# Patient Record
Sex: MALE | Race: WHITE | Employment: FULL TIME | URBAN - METROPOLITAN AREA
[De-identification: names, ages, dates, MRNs, and addresses within clinical notes are randomized per-mention and may not be internally consistent; named-entity substitution may affect disease eponyms.]

---

## 2020-11-25 ENCOUNTER — OFFICE VISIT (OUTPATIENT)
Dept: FAMILY MEDICINE CLINIC | Age: 30
End: 2020-11-25
Payer: COMMERCIAL

## 2020-11-25 VITALS
HEIGHT: 71 IN | WEIGHT: 225 LBS | TEMPERATURE: 97.2 F | BODY MASS INDEX: 31.5 KG/M2 | HEART RATE: 105 BPM | OXYGEN SATURATION: 96 %

## 2020-11-25 PROCEDURE — G8427 DOCREV CUR MEDS BY ELIG CLIN: HCPCS | Performed by: NURSE PRACTITIONER

## 2020-11-25 PROCEDURE — 99213 OFFICE O/P EST LOW 20 MIN: CPT | Performed by: NURSE PRACTITIONER

## 2020-11-25 PROCEDURE — G8417 CALC BMI ABV UP PARAM F/U: HCPCS | Performed by: NURSE PRACTITIONER

## 2020-11-25 PROCEDURE — 4004F PT TOBACCO SCREEN RCVD TLK: CPT | Performed by: NURSE PRACTITIONER

## 2020-11-25 PROCEDURE — G8484 FLU IMMUNIZE NO ADMIN: HCPCS | Performed by: NURSE PRACTITIONER

## 2020-11-25 RX ORDER — PREDNISONE 20 MG/1
40 TABLET ORAL DAILY
Qty: 6 TABLET | Refills: 0 | Status: SHIPPED | OUTPATIENT
Start: 2020-11-25 | End: 2020-11-28

## 2020-11-25 RX ORDER — MECLIZINE HYDROCHLORIDE 25 MG/1
25 TABLET ORAL 3 TIMES DAILY PRN
Qty: 20 TABLET | Refills: 0 | Status: SHIPPED | OUTPATIENT
Start: 2020-11-25 | End: 2020-12-05

## 2020-11-25 RX ORDER — OMEPRAZOLE 20 MG/1
40 CAPSULE, DELAYED RELEASE ORAL DAILY
COMMUNITY

## 2020-11-25 RX ORDER — AMOXICILLIN 875 MG/1
875 TABLET, COATED ORAL 2 TIMES DAILY
Qty: 14 TABLET | Refills: 0 | Status: SHIPPED | OUTPATIENT
Start: 2020-11-25 | End: 2020-12-02

## 2020-11-25 ASSESSMENT — ENCOUNTER SYMPTOMS
CHEST TIGHTNESS: 0
SINUS PRESSURE: 0
WHEEZING: 0
VOMITING: 1
EYE DISCHARGE: 0
COUGH: 0
SHORTNESS OF BREATH: 0
VOICE CHANGE: 0
BACK PAIN: 1
SORE THROAT: 0
NAUSEA: 1
EYE REDNESS: 0

## 2020-11-25 NOTE — PROGRESS NOTES
[address] [facility]  [address] [place] [address]  Dept: [phone]  Dept Fax: [phone]     Vandana Toure is a 35 y.o. male who presents to the urgent care today for his medicalconditions/complaints as noted below. Vandana Toure is c/o of Covid Testing (left ear is ringing, throwing up, dizzy, lower back pain )    HPI:      Otalgia    There is pain in the left ear. This is a new problem. Episode onset: 2 weeks ago. The problem has been waxing and waning. There has been no fever. Associated symptoms include vomiting. Pertinent negatives include no coughing, ear discharge, headaches, rash or sore throat. He has tried nothing for the symptoms. The treatment provided no relief. History reviewed. No pertinent past medical history. Current Outpatient Medications   Medication Sig Dispense Refill    omeprazole (PRILOSEC) 20 MG delayed release capsule Take 40 mg by mouth daily      predniSONE (DELTASONE) 20 MG tablet Take 2 tablets by mouth daily for 3 days 6 tablet 0    amoxicillin (AMOXIL) 875 MG tablet Take 1 tablet by mouth 2 times daily for 7 days 14 tablet 0    meclizine (ANTIVERT) 25 MG tablet Take 1 tablet by mouth 3 times daily as needed for Dizziness or Nausea 20 tablet 0     No current facility-administered medications for this visit. No Known Allergies    Reviewed PMH, SH, and FH with the patient and updated. Subjective:      Review of Systems   Constitutional: Negative for chills, fatigue and fever. HENT: Positive for ear pain (left) and tinnitus (left). Negative for congestion, ear discharge, postnasal drip, sinus pressure, sneezing, sore throat and voice change. Eyes: Negative for discharge and redness. Respiratory: Negative for cough, chest tightness, shortness of breath and wheezing. Cardiovascular: Negative. Negative for chest pain. Gastrointestinal: Positive for nausea and vomiting.    Musculoskeletal: sciatica         Plan:      Left ear pain--  Patient instructed to complete antibiotic prescription fully. Prednisone sent to the pharmacy to help enable symptom relief. Meclizine as needed for the dizziness/nausea. May use Tylenol for fever/pain. Warm compresses as desired for ear pain. Low back pain--  Motrin 800 mg TID for the discomfort/inflammation. Declined flexeril at this time. Heat therapy if desired. Home stretches provided on AVS.  Patient agreeable to treatment plan. Follow up if symptoms do not improve/worsen. Orders Placed This Encounter   Medications    predniSONE (DELTASONE) 20 MG tablet     Sig: Take 2 tablets by mouth daily for 3 days     Dispense:  6 tablet     Refill:  0    amoxicillin (AMOXIL) 875 MG tablet     Sig: Take 1 tablet by mouth 2 times daily for 7 days     Dispense:  14 tablet     Refill:  0    meclizine (ANTIVERT) 25 MG tablet     Sig: Take 1 tablet by mouth 3 times daily as needed for Dizziness or Nausea     Dispense:  20 tablet     Refill:  0        Patient given educational materials - see patient instructions. Discussed use, benefit, and side effects of prescribed medications. All patientquestions answered. Pt voiced understanding.     Electronically signed by SHRADDHA Spence CNP on 11/25/2020at 2:04 PM